# Patient Record
Sex: MALE | Race: WHITE | Employment: FULL TIME | URBAN - METROPOLITAN AREA
[De-identification: names, ages, dates, MRNs, and addresses within clinical notes are randomized per-mention and may not be internally consistent; named-entity substitution may affect disease eponyms.]

---

## 2019-04-27 ENCOUNTER — APPOINTMENT (OUTPATIENT)
Dept: GENERAL RADIOLOGY | Age: 59
End: 2019-04-27
Attending: FAMILY MEDICINE
Payer: COMMERCIAL

## 2019-04-27 ENCOUNTER — HOSPITAL ENCOUNTER (OUTPATIENT)
Age: 59
Discharge: HOME | End: 2019-04-27
Attending: FAMILY MEDICINE
Payer: COMMERCIAL

## 2019-04-27 VITALS
WEIGHT: 175 LBS | BODY MASS INDEX: 28.13 KG/M2 | SYSTOLIC BLOOD PRESSURE: 170 MMHG | HEART RATE: 90 BPM | DIASTOLIC BLOOD PRESSURE: 79 MMHG | TEMPERATURE: 98 F | RESPIRATION RATE: 18 BRPM | OXYGEN SATURATION: 100 % | HEIGHT: 66 IN

## 2019-04-27 DIAGNOSIS — R03.0 BLOOD PRESSURE ELEVATED WITHOUT HISTORY OF HTN: ICD-10-CM

## 2019-04-27 DIAGNOSIS — J40 BRONCHITIS: Primary | ICD-10-CM

## 2019-04-27 PROCEDURE — 71046 X-RAY EXAM CHEST 2 VIEWS: CPT | Performed by: FAMILY MEDICINE

## 2019-04-27 PROCEDURE — 99203 OFFICE O/P NEW LOW 30 MIN: CPT

## 2019-04-27 PROCEDURE — 99204 OFFICE O/P NEW MOD 45 MIN: CPT

## 2019-04-27 RX ORDER — ALBUTEROL SULFATE 90 UG/1
2 AEROSOL, METERED RESPIRATORY (INHALATION) EVERY 6 HOURS PRN
Qty: 1 INHALER | Refills: 0 | Status: SHIPPED | OUTPATIENT
Start: 2019-04-27 | End: 2019-05-27

## 2019-04-27 RX ORDER — AZITHROMYCIN 250 MG/1
TABLET, FILM COATED ORAL
Qty: 1 PACKAGE | Refills: 0 | Status: SHIPPED | OUTPATIENT
Start: 2019-04-27 | End: 2019-05-02

## 2019-04-27 RX ORDER — PROMETHAZINE HYDROCHLORIDE AND CODEINE PHOSPHATE 6.25; 1 MG/5ML; MG/5ML
5 SYRUP ORAL EVERY 6 HOURS PRN
Qty: 120 ML | Refills: 0 | Status: SHIPPED | OUTPATIENT
Start: 2019-04-27 | End: 2019-05-27

## 2019-04-27 NOTE — ED PROVIDER NOTES
Patient Seen in: [address]    History   Patient presents with:  Cough/URI    Stated Complaint: COUGH    HPI    Patient here with cough, congestion for 7 days. No travel, no known sick contacts.   Patient denies sig shortn no adenopathy, no thyromegaly  THROAT: pnd noted, post phaynx injected,  LUNGS: no accessory use, increased upper airway sounds, intermittent rhonchi without rales or wheeze  CARDIO: RRR without murmur  EXTREMITIES: no cyanosis, clubbing or edema  GI: soft

## 2019-07-02 ENCOUNTER — HOSPITAL ENCOUNTER (OUTPATIENT)
Age: 59
Discharge: HOME | End: 2019-07-02
Attending: EMERGENCY MEDICINE
Payer: COMMERCIAL

## 2019-07-02 VITALS
TEMPERATURE: 98 F | HEIGHT: 66 IN | OXYGEN SATURATION: 98 % | WEIGHT: 185 LBS | SYSTOLIC BLOOD PRESSURE: 169 MMHG | RESPIRATION RATE: 18 BRPM | BODY MASS INDEX: 29.73 KG/M2 | HEART RATE: 93 BPM | DIASTOLIC BLOOD PRESSURE: 96 MMHG

## 2019-07-02 DIAGNOSIS — J02.9 ACUTE PHARYNGITIS, UNSPECIFIED ETIOLOGY: Primary | ICD-10-CM

## 2019-07-02 DIAGNOSIS — R03.0 ELEVATED BLOOD-PRESSURE READING WITHOUT DIAGNOSIS OF HYPERTENSION: ICD-10-CM

## 2019-07-02 LAB — S PYO AG THROAT QL: NEGATIVE

## 2019-07-02 PROCEDURE — 87430 STREP A AG IA: CPT

## 2019-07-02 PROCEDURE — 99214 OFFICE O/P EST MOD 30 MIN: CPT

## 2019-07-02 PROCEDURE — 99213 OFFICE O/P EST LOW 20 MIN: CPT

## 2019-07-02 RX ORDER — DEXAMETHASONE 4 MG/1
8 TABLET ORAL ONCE
Status: COMPLETED | OUTPATIENT
Start: 2019-07-02 | End: 2019-07-02

## 2019-07-02 RX ORDER — CLINDAMYCIN HYDROCHLORIDE 300 MG/1
300 CAPSULE ORAL 3 TIMES DAILY
Qty: 30 CAPSULE | Refills: 0 | Status: SHIPPED | OUTPATIENT
Start: 2019-07-02 | End: 2019-07-12

## 2019-07-02 NOTE — ED PROVIDER NOTES
Patient Seen in: [address]    History   Patient presents with:  Sore Throat: right -sided throat pain    Stated Complaint: Test for Strep    HPI    The patient is a 76-year-old male without significant past medical hist swelling or discharge in the canals, mastoid sinuses nontender  Nose: No visible mucoid or purulent discharge, no erythema or edema of the mucosa  Sinuses: Diffusely nontender  Pharynx: Erythema with asymmetric swelling, right greater than left, no bulging

## 2019-07-02 NOTE — ED INITIAL ASSESSMENT (HPI)
Pt in [address] [place] [address] c/o right-sided throat pain for 3 days. +Painful swallowing, post-nasal drip. Denied strep exposure, cough, fever.

## 2021-10-22 PROBLEM — M79.644 PAIN IN FINGER OF BOTH HANDS: Status: ACTIVE | Noted: 2021-03-04

## 2021-10-22 PROBLEM — E11.8 CONTROLLED TYPE 2 DIABETES MELLITUS WITH COMPLICATION, WITHOUT LONG-TERM CURRENT USE OF INSULIN (HCC): Status: ACTIVE | Noted: 2021-10-22

## 2021-10-22 PROBLEM — I10 ESSENTIAL HYPERTENSION: Status: ACTIVE | Noted: 2021-10-22

## 2021-10-22 PROBLEM — M79.645 PAIN IN FINGER OF BOTH HANDS: Status: ACTIVE | Noted: 2021-03-04
